# Patient Record
Sex: FEMALE | Race: WHITE | NOT HISPANIC OR LATINO | Employment: OTHER | URBAN - METROPOLITAN AREA
[De-identification: names, ages, dates, MRNs, and addresses within clinical notes are randomized per-mention and may not be internally consistent; named-entity substitution may affect disease eponyms.]

---

## 2023-11-20 ENCOUNTER — HOSPITAL ENCOUNTER (OUTPATIENT)
Facility: CLINIC | Age: 58
Setting detail: OBSERVATION
Discharge: HOME | End: 2023-11-22
Attending: EMERGENCY MEDICINE | Admitting: INTERNAL MEDICINE
Payer: COMMERCIAL

## 2023-11-20 ENCOUNTER — APPOINTMENT (OUTPATIENT)
Dept: CT IMAGING | Facility: CLINIC | Age: 58
End: 2023-11-20
Attending: EMERGENCY MEDICINE
Payer: COMMERCIAL

## 2023-11-20 ENCOUNTER — APPOINTMENT (OUTPATIENT)
Dept: GENERAL RADIOLOGY | Facility: CLINIC | Age: 58
End: 2023-11-20
Attending: EMERGENCY MEDICINE
Payer: COMMERCIAL

## 2023-11-20 ENCOUNTER — APPOINTMENT (OUTPATIENT)
Dept: CARDIOLOGY | Facility: CLINIC | Age: 58
End: 2023-11-20
Attending: INTERNAL MEDICINE
Payer: COMMERCIAL

## 2023-11-20 DIAGNOSIS — I10 ESSENTIAL HYPERTENSION: ICD-10-CM

## 2023-11-20 DIAGNOSIS — I95.9 HYPOTENSION, UNSPECIFIED HYPOTENSION TYPE: ICD-10-CM

## 2023-11-20 DIAGNOSIS — J18.9 PNEUMONIA OF RIGHT LOWER LOBE DUE TO INFECTIOUS ORGANISM: ICD-10-CM

## 2023-11-20 DIAGNOSIS — R52 PAIN: Primary | ICD-10-CM

## 2023-11-20 LAB
ALBUMIN SERPL BCG-MCNC: 3.9 G/DL (ref 3.5–5.2)
ALP SERPL-CCNC: 96 U/L (ref 40–150)
ALT SERPL W P-5'-P-CCNC: 42 U/L (ref 0–50)
ANION GAP SERPL CALCULATED.3IONS-SCNC: 7 MMOL/L (ref 7–15)
AST SERPL W P-5'-P-CCNC: 34 U/L (ref 0–45)
ATRIAL RATE - MUSE: 63 BPM
BASOPHILS # BLD AUTO: 0.1 10E3/UL (ref 0–0.2)
BASOPHILS NFR BLD AUTO: 1 %
BILIRUB SERPL-MCNC: 0.3 MG/DL
BUN SERPL-MCNC: 13.6 MG/DL (ref 6–20)
CALCIUM SERPL-MCNC: 9.9 MG/DL (ref 8.6–10)
CHLORIDE SERPL-SCNC: 105 MMOL/L (ref 98–107)
CREAT SERPL-MCNC: 1.25 MG/DL (ref 0.51–0.95)
DEPRECATED HCO3 PLAS-SCNC: 26 MMOL/L (ref 22–29)
DIASTOLIC BLOOD PRESSURE - MUSE: NORMAL MMHG
EGFRCR SERPLBLD CKD-EPI 2021: 50 ML/MIN/1.73M2
EOSINOPHIL # BLD AUTO: 0.5 10E3/UL (ref 0–0.7)
EOSINOPHIL NFR BLD AUTO: 6 %
ERYTHROCYTE [DISTWIDTH] IN BLOOD BY AUTOMATED COUNT: 12.1 % (ref 10–15)
ETHANOL SERPL-MCNC: <0.01 G/DL
GLUCOSE SERPL-MCNC: 111 MG/DL (ref 70–99)
HCO3 BLDV-SCNC: 24 MMOL/L (ref 21–28)
HCT VFR BLD AUTO: 37.1 % (ref 35–47)
HGB BLD-MCNC: 12 G/DL (ref 11.7–15.7)
HOLD SPECIMEN: NORMAL
IMM GRANULOCYTES # BLD: 0 10E3/UL
IMM GRANULOCYTES NFR BLD: 0 %
INTERPRETATION ECG - MUSE: NORMAL
LACTATE BLD-SCNC: 1.3 MMOL/L
LACTATE SERPL-SCNC: 1 MMOL/L (ref 0.7–2)
LVEF ECHO: NORMAL
LYMPHOCYTES # BLD AUTO: 2.7 10E3/UL (ref 0.8–5.3)
LYMPHOCYTES NFR BLD AUTO: 38 %
MCH RBC QN AUTO: 31.2 PG (ref 26.5–33)
MCHC RBC AUTO-ENTMCNC: 32.3 G/DL (ref 31.5–36.5)
MCV RBC AUTO: 96 FL (ref 78–100)
MONOCYTES # BLD AUTO: 0.4 10E3/UL (ref 0–1.3)
MONOCYTES NFR BLD AUTO: 5 %
NEUTROPHILS # BLD AUTO: 3.7 10E3/UL (ref 1.6–8.3)
NEUTROPHILS NFR BLD AUTO: 50 %
NRBC # BLD AUTO: 0 10E3/UL
NRBC BLD AUTO-RTO: 0 /100
NT-PROBNP SERPL-MCNC: 44 PG/ML (ref 0–900)
P AXIS - MUSE: 38 DEGREES
PCO2 BLDV: 44 MM HG (ref 40–50)
PH BLDV: 7.35 [PH] (ref 7.32–7.43)
PLATELET # BLD AUTO: 241 10E3/UL (ref 150–450)
PO2 BLDV: 26 MM HG (ref 25–47)
POTASSIUM SERPL-SCNC: 4.2 MMOL/L (ref 3.4–5.3)
PR INTERVAL - MUSE: 170 MS
PROT SERPL-MCNC: 6.1 G/DL (ref 6.4–8.3)
QRS DURATION - MUSE: 80 MS
QT - MUSE: 416 MS
QTC - MUSE: 425 MS
R AXIS - MUSE: 13 DEGREES
RBC # BLD AUTO: 3.85 10E6/UL (ref 3.8–5.2)
SAO2 % BLDV: 44 % (ref 94–100)
SODIUM SERPL-SCNC: 138 MMOL/L (ref 135–145)
SYSTOLIC BLOOD PRESSURE - MUSE: NORMAL MMHG
T AXIS - MUSE: 21 DEGREES
TROPONIN T SERPL HS-MCNC: 6 NG/L
TROPONIN T SERPL HS-MCNC: 8 NG/L
VENTRICULAR RATE- MUSE: 63 BPM
WBC # BLD AUTO: 7.3 10E3/UL (ref 4–11)

## 2023-11-20 PROCEDURE — 96361 HYDRATE IV INFUSION ADD-ON: CPT

## 2023-11-20 PROCEDURE — 250N000013 HC RX MED GY IP 250 OP 250 PS 637: Performed by: STUDENT IN AN ORGANIZED HEALTH CARE EDUCATION/TRAINING PROGRAM

## 2023-11-20 PROCEDURE — 255N000002 HC RX 255 OP 636: Performed by: INTERNAL MEDICINE

## 2023-11-20 PROCEDURE — 258N000003 HC RX IP 258 OP 636: Performed by: INTERNAL MEDICINE

## 2023-11-20 PROCEDURE — 96367 TX/PROPH/DG ADDL SEQ IV INF: CPT

## 2023-11-20 PROCEDURE — 99223 1ST HOSP IP/OBS HIGH 75: CPT | Mod: AI | Performed by: INTERNAL MEDICINE

## 2023-11-20 PROCEDURE — 71046 X-RAY EXAM CHEST 2 VIEWS: CPT

## 2023-11-20 PROCEDURE — 36415 COLL VENOUS BLD VENIPUNCTURE: CPT | Performed by: EMERGENCY MEDICINE

## 2023-11-20 PROCEDURE — 82077 ASSAY SPEC XCP UR&BREATH IA: CPT | Performed by: EMERGENCY MEDICINE

## 2023-11-20 PROCEDURE — 96365 THER/PROPH/DIAG IV INF INIT: CPT | Mod: XU

## 2023-11-20 PROCEDURE — 258N000003 HC RX IP 258 OP 636: Performed by: EMERGENCY MEDICINE

## 2023-11-20 PROCEDURE — 250N000011 HC RX IP 250 OP 636: Mod: JZ | Performed by: EMERGENCY MEDICINE

## 2023-11-20 PROCEDURE — 84484 ASSAY OF TROPONIN QUANT: CPT | Performed by: EMERGENCY MEDICINE

## 2023-11-20 PROCEDURE — 93306 TTE W/DOPPLER COMPLETE: CPT | Mod: 26 | Performed by: INTERNAL MEDICINE

## 2023-11-20 PROCEDURE — G0378 HOSPITAL OBSERVATION PER HR: HCPCS

## 2023-11-20 PROCEDURE — 250N000011 HC RX IP 250 OP 636: Performed by: EMERGENCY MEDICINE

## 2023-11-20 PROCEDURE — 71260 CT THORAX DX C+: CPT

## 2023-11-20 PROCEDURE — 96368 THER/DIAG CONCURRENT INF: CPT

## 2023-11-20 PROCEDURE — 250N000009 HC RX 250: Performed by: EMERGENCY MEDICINE

## 2023-11-20 PROCEDURE — C8929 TTE W OR WO FOL WCON,DOPPLER: HCPCS

## 2023-11-20 PROCEDURE — 87040 BLOOD CULTURE FOR BACTERIA: CPT | Mod: 91 | Performed by: EMERGENCY MEDICINE

## 2023-11-20 PROCEDURE — 80053 COMPREHEN METABOLIC PANEL: CPT | Performed by: EMERGENCY MEDICINE

## 2023-11-20 PROCEDURE — 99292 CRITICAL CARE ADDL 30 MIN: CPT

## 2023-11-20 PROCEDURE — 99291 CRITICAL CARE FIRST HOUR: CPT | Mod: 25

## 2023-11-20 PROCEDURE — 83605 ASSAY OF LACTIC ACID: CPT | Performed by: EMERGENCY MEDICINE

## 2023-11-20 PROCEDURE — 93005 ELECTROCARDIOGRAM TRACING: CPT

## 2023-11-20 PROCEDURE — 83880 ASSAY OF NATRIURETIC PEPTIDE: CPT | Performed by: ANESTHESIOLOGY

## 2023-11-20 PROCEDURE — 250N000013 HC RX MED GY IP 250 OP 250 PS 637: Performed by: INTERNAL MEDICINE

## 2023-11-20 PROCEDURE — 85025 COMPLETE CBC W/AUTO DIFF WBC: CPT | Performed by: EMERGENCY MEDICINE

## 2023-11-20 PROCEDURE — 82803 BLOOD GASES ANY COMBINATION: CPT

## 2023-11-20 RX ORDER — LISINOPRIL 40 MG/1
40 TABLET ORAL DAILY
Status: ON HOLD | COMMUNITY
End: 2023-11-22

## 2023-11-20 RX ORDER — AZITHROMYCIN 500 MG/1
500 INJECTION, POWDER, LYOPHILIZED, FOR SOLUTION INTRAVENOUS ONCE
Status: COMPLETED | OUTPATIENT
Start: 2023-11-20 | End: 2023-11-20

## 2023-11-20 RX ORDER — CYCLOBENZAPRINE HCL 10 MG
10 TABLET ORAL 2 TIMES DAILY PRN
Status: DISCONTINUED | OUTPATIENT
Start: 2023-11-20 | End: 2023-11-22 | Stop reason: HOSPADM

## 2023-11-20 RX ORDER — PREGABALIN 100 MG/1
100 CAPSULE ORAL 3 TIMES DAILY
COMMUNITY

## 2023-11-20 RX ORDER — CEFTRIAXONE 1 G/1
1 INJECTION, POWDER, FOR SOLUTION INTRAMUSCULAR; INTRAVENOUS EVERY 24 HOURS
Status: DISCONTINUED | OUTPATIENT
Start: 2023-11-21 | End: 2023-11-20

## 2023-11-20 RX ORDER — ROPIVACAINE IN 0.9% SOD CHL/PF 0.1 %
.03-.125 PLASTIC BAG, INJECTION (ML) EPIDURAL CONTINUOUS
Status: DISCONTINUED | OUTPATIENT
Start: 2023-11-20 | End: 2023-11-20

## 2023-11-20 RX ORDER — METOPROLOL TARTRATE 50 MG
50 TABLET ORAL DAILY
COMMUNITY

## 2023-11-20 RX ORDER — IOPAMIDOL 755 MG/ML
75 INJECTION, SOLUTION INTRAVASCULAR ONCE
Status: COMPLETED | OUTPATIENT
Start: 2023-11-20 | End: 2023-11-20

## 2023-11-20 RX ORDER — ACETAMINOPHEN 325 MG/1
650 TABLET ORAL EVERY 4 HOURS PRN
Status: DISCONTINUED | OUTPATIENT
Start: 2023-11-20 | End: 2023-11-22 | Stop reason: HOSPADM

## 2023-11-20 RX ORDER — AZITHROMYCIN 250 MG/1
250 TABLET, FILM COATED ORAL DAILY
Status: DISCONTINUED | OUTPATIENT
Start: 2023-11-21 | End: 2023-11-22 | Stop reason: HOSPADM

## 2023-11-20 RX ORDER — AMOXICILLIN 250 MG
1 CAPSULE ORAL 2 TIMES DAILY PRN
Status: DISCONTINUED | OUTPATIENT
Start: 2023-11-20 | End: 2023-11-22 | Stop reason: HOSPADM

## 2023-11-20 RX ORDER — PAROXETINE 20 MG/1
20 TABLET, FILM COATED ORAL EVERY MORNING
COMMUNITY

## 2023-11-20 RX ORDER — GUAIFENESIN/DEXTROMETHORPHAN 100-10MG/5
10 SYRUP ORAL EVERY 4 HOURS PRN
Status: DISCONTINUED | OUTPATIENT
Start: 2023-11-20 | End: 2023-11-22 | Stop reason: HOSPADM

## 2023-11-20 RX ORDER — BENZONATATE 100 MG/1
100 CAPSULE ORAL 3 TIMES DAILY PRN
Status: DISCONTINUED | OUTPATIENT
Start: 2023-11-20 | End: 2023-11-22 | Stop reason: HOSPADM

## 2023-11-20 RX ORDER — ACETAMINOPHEN 500 MG
1000 TABLET ORAL ONCE
Status: COMPLETED | OUTPATIENT
Start: 2023-11-20 | End: 2023-11-20

## 2023-11-20 RX ORDER — CYCLOBENZAPRINE HCL 10 MG
10 TABLET ORAL 2 TIMES DAILY PRN
COMMUNITY

## 2023-11-20 RX ORDER — ONDANSETRON 4 MG/1
4 TABLET, ORALLY DISINTEGRATING ORAL EVERY 6 HOURS PRN
Status: DISCONTINUED | OUTPATIENT
Start: 2023-11-20 | End: 2023-11-22 | Stop reason: HOSPADM

## 2023-11-20 RX ORDER — MIDODRINE HYDROCHLORIDE 10 MG/1
10 TABLET ORAL ONCE
Status: COMPLETED | OUTPATIENT
Start: 2023-11-20 | End: 2023-11-20

## 2023-11-20 RX ORDER — CEFTRIAXONE 2 G/1
2 INJECTION, POWDER, FOR SOLUTION INTRAMUSCULAR; INTRAVENOUS ONCE
Status: COMPLETED | OUTPATIENT
Start: 2023-11-20 | End: 2023-11-20

## 2023-11-20 RX ORDER — PREGABALIN 100 MG/1
100 CAPSULE ORAL 3 TIMES DAILY
Status: DISCONTINUED | OUTPATIENT
Start: 2023-11-20 | End: 2023-11-22 | Stop reason: HOSPADM

## 2023-11-20 RX ORDER — CEFTRIAXONE 2 G/1
2 INJECTION, POWDER, FOR SOLUTION INTRAMUSCULAR; INTRAVENOUS EVERY 24 HOURS
Status: DISCONTINUED | OUTPATIENT
Start: 2023-11-21 | End: 2023-11-22

## 2023-11-20 RX ORDER — AMOXICILLIN 250 MG
2 CAPSULE ORAL 2 TIMES DAILY PRN
Status: DISCONTINUED | OUTPATIENT
Start: 2023-11-20 | End: 2023-11-22 | Stop reason: HOSPADM

## 2023-11-20 RX ORDER — ONDANSETRON 2 MG/ML
4 INJECTION INTRAMUSCULAR; INTRAVENOUS EVERY 6 HOURS PRN
Status: DISCONTINUED | OUTPATIENT
Start: 2023-11-20 | End: 2023-11-22 | Stop reason: HOSPADM

## 2023-11-20 RX ORDER — SODIUM CHLORIDE 9 MG/ML
INJECTION, SOLUTION INTRAVENOUS CONTINUOUS
Status: DISCONTINUED | OUTPATIENT
Start: 2023-11-20 | End: 2023-11-21

## 2023-11-20 RX ORDER — ACETAMINOPHEN 650 MG/1
650 SUPPOSITORY RECTAL EVERY 4 HOURS PRN
Status: DISCONTINUED | OUTPATIENT
Start: 2023-11-20 | End: 2023-11-22 | Stop reason: HOSPADM

## 2023-11-20 RX ADMIN — SODIUM CHLORIDE: 9 INJECTION, SOLUTION INTRAVENOUS at 14:17

## 2023-11-20 RX ADMIN — CYCLOBENZAPRINE 10 MG: 10 TABLET, FILM COATED ORAL at 14:21

## 2023-11-20 RX ADMIN — GUAIFENESIN AND DEXTROMETHORPHAN 10 ML: 100; 10 SYRUP ORAL at 17:33

## 2023-11-20 RX ADMIN — PREGABALIN 100 MG: 100 CAPSULE ORAL at 22:41

## 2023-11-20 RX ADMIN — SODIUM CHLORIDE 80 ML: 9 INJECTION, SOLUTION INTRAVENOUS at 06:04

## 2023-11-20 RX ADMIN — SODIUM CHLORIDE, POTASSIUM CHLORIDE, SODIUM LACTATE AND CALCIUM CHLORIDE 1000 ML: 600; 310; 30; 20 INJECTION, SOLUTION INTRAVENOUS at 05:23

## 2023-11-20 RX ADMIN — GUAIFENESIN AND DEXTROMETHORPHAN 10 ML: 100; 10 SYRUP ORAL at 21:34

## 2023-11-20 RX ADMIN — IOPAMIDOL 75 ML: 755 INJECTION, SOLUTION INTRAVENOUS at 06:04

## 2023-11-20 RX ADMIN — CEFTRIAXONE SODIUM 2 G: 2 INJECTION, POWDER, FOR SOLUTION INTRAMUSCULAR; INTRAVENOUS at 06:52

## 2023-11-20 RX ADMIN — HUMAN ALBUMIN MICROSPHERES AND PERFLUTREN 9 ML: 10; .22 INJECTION, SOLUTION INTRAVENOUS at 15:22

## 2023-11-20 RX ADMIN — ACETAMINOPHEN 1000 MG: 500 TABLET, FILM COATED ORAL at 11:17

## 2023-11-20 RX ADMIN — SODIUM CHLORIDE, POTASSIUM CHLORIDE, SODIUM LACTATE AND CALCIUM CHLORIDE 1000 ML: 600; 310; 30; 20 INJECTION, SOLUTION INTRAVENOUS at 05:39

## 2023-11-20 RX ADMIN — SODIUM CHLORIDE, POTASSIUM CHLORIDE, SODIUM LACTATE AND CALCIUM CHLORIDE 1000 ML: 600; 310; 30; 20 INJECTION, SOLUTION INTRAVENOUS at 07:55

## 2023-11-20 RX ADMIN — PREGABALIN 100 MG: 100 CAPSULE ORAL at 14:21

## 2023-11-20 RX ADMIN — NOREPINEPHRINE BITARTRATE 0.03 MCG/KG/MIN: 0.02 INJECTION, SOLUTION INTRAVENOUS at 05:35

## 2023-11-20 RX ADMIN — ACETAMINOPHEN 650 MG: 325 TABLET, FILM COATED ORAL at 22:56

## 2023-11-20 RX ADMIN — AZITHROMYCIN MONOHYDRATE 500 MG: 500 INJECTION, POWDER, LYOPHILIZED, FOR SOLUTION INTRAVENOUS at 06:58

## 2023-11-20 ASSESSMENT — ACTIVITIES OF DAILY LIVING (ADL)
ADLS_ACUITY_SCORE: 23
ADLS_ACUITY_SCORE: 35
ADLS_ACUITY_SCORE: 23
ADLS_ACUITY_SCORE: 35
ADLS_ACUITY_SCORE: 35
ADLS_ACUITY_SCORE: 23
ADLS_ACUITY_SCORE: 23
ADLS_ACUITY_SCORE: 35
ADLS_ACUITY_SCORE: 22

## 2023-11-20 NOTE — ED NOTES
[facility]  ED Nurse Handoff Report    ED Chief complaint: Hypotension      ED Diagnosis:   Final diagnoses:   Hypotension, unspecified hypotension type   Pneumonia of right lower lobe due to infectious organism       Code Status: Full Code    Allergies: No Known Allergies    Patient Story: patient reports she was at home watching TV as she could not sleep. EMS reports that she took 4 mg of tizanidine for her pain due to the neck plate around 0000. She states that she did not feel any relief, which was unusual, and decided to take another 4 mg 2-3 hours later as she was confused as to whether or not she actually took the 1st dose. She then onset of dizziness when she stood up, prompting her to call EMS. She also had a brief episode of chest pain which resolved quickly   Focused Assessment:  cough and sob     Treatments and/or interventions provided: ct, xrya , meds , labs   Labs Ordered and Resulted from Time of ED Arrival to Time of ED Departure   COMPREHENSIVE METABOLIC PANEL - Abnormal       Result Value    Sodium 138      Potassium 4.2      Carbon Dioxide (CO2) 26      Anion Gap 7      Urea Nitrogen 13.6      Creatinine 1.25 (*)     GFR Estimate 50 (*)     Calcium 9.9      Chloride 105      Glucose 111 (*)     Alkaline Phosphatase 96      AST 34      ALT 42      Protein Total 6.1 (*)     Albumin 3.9      Bilirubin Total 0.3     ISTAT GASES LACTATE VENOUS POCT - Abnormal    Lactic Acid POCT 1.3      Bicarbonate Venous POCT 24      O2 Sat, Venous POCT 44 (*)     pCO2 Venous POCT 44      pH Venous POCT 7.35      pO2 Venous POCT 26     TROPONIN T, HIGH SENSITIVITY - Normal    Troponin T, High Sensitivity 8     ETHYL ALCOHOL LEVEL - Normal    Alcohol ethyl <0.01     LACTIC ACID WHOLE BLOOD - Normal    Lactic Acid 1.0     TROPONIN T, HIGH SENSITIVITY - Normal    Troponin T, High Sensitivity 6     CBC WITH PLATELETS AND DIFFERENTIAL    WBC Count 7.3      RBC Count 3.85      Hemoglobin 12.0       Hematocrit 37.1      MCV 96      MCH 31.2      MCHC 32.3      RDW 12.1      Platelet Count 241      % Neutrophils 50      % Lymphocytes 38      % Monocytes 5      % Eosinophils 6      % Basophils 1      % Immature Granulocytes 0      NRBCs per 100 WBC 0      Absolute Neutrophils 3.7      Absolute Lymphocytes 2.7      Absolute Monocytes 0.4      Absolute Eosinophils 0.5      Absolute Basophils 0.1      Absolute Immature Granulocytes 0.0      Absolute NRBCs 0.0     NT PROBNP INPATIENT   BLOOD CULTURE   BLOOD CULTURE      CT Aortic Survey w Contrast   Final Result   IMPRESSION:   1.  No aortic aneurysm or dissection.   2.  Right lower lobe pneumonia.   3.  Small amount of free pelvic fluid. No abdominal or pelvic inflammation.   4.  Fatty infiltration of the liver.                        XR Chest 2 Views   Final Result   IMPRESSION: Lungs are clear. No pleural effusion or pneumothorax. Normal heart size and pulmonary vascularity. Cervical spine fusion hardware.         Patient's response to treatments and/or interventions:  tolerated     To be done/followed up on inpatient unit:  na    Does this patient have any cognitive concerns?:       Activity level - Baseline/Home:  Independent  Activity Level - Current:   Independent    Patient's Preferred language: English   Needed?: No    Isolation: None  Infection: Not Applicable  Patient tested for COVID 19 prior to admission: NO  Bariatric?: No    Vital Signs:   Vitals:    11/20/23 0830 11/20/23 0831 11/20/23 0832 11/20/23 0833   BP: 125/68      Pulse: 60 61 59 60   Resp: 19 17 19 18   Temp:       TempSrc:       SpO2: 98% 98% 98% 98%   Weight:       Height:           Cardiac Rhythm:     Was the PSS-3 completed:   Yes  What interventions are required if any?               Family Comments:   OBS brochure/video discussed/provided to patient/family: Yes              Name of person given brochure if not patient:               Relationship to patient:     For the  majority of the shift this patient's behavior was Green.   Behavioral interventions performed were .    ED NURSE PHONE NUMBER: [phone]

## 2023-11-20 NOTE — ED PROVIDER NOTES
History     Chief Complaint:  Hypotension       The history is provided by the EMS personnel and the patient.      Zahida Mcgee is a 58 year old female with a neck plate who presents via EMS with hypotension after taking tizanidine. The patient reports she was at home watching TV as she could not sleep. EMS reports that she took 4 mg of tizanidine for her pain due to the neck plate around 0000. She states that she did not feel any relief, which was unusual, and decided to take another 4 mg 2-3 hours later as she was confused as to whether or not she actually took the 1st dose. She then onset of dizziness when she stood up, prompting her to call EMS. She also had a brief episode of chest pain which resolved quickly. EMS states that her blood pressure was initially 90/50. She was able to walk down to the ambulance but then became increasingly hypotensive, with the pressure dropping to 79/43 then 68/42. Denies any alcohol or drug use. She also takes metoprolol and lisinopril. EMS notes patient's blood sugar was normal. Zahida denies any nausea, shortness of breath, diaphoresis, or urinary symptoms. She feels okay at presentation.    Independent Historian:    EMS - provided history     Review of External Notes:  Pain clinic note 8/21/23 discussing medication management.    Medications:    Metoprolol  Lisinopril  Tizanidine  Albuterol  Colace  Lyrica  Tramadol  Claritin  Paxil  Loratadine  Flexeril  Cymbalata   Topamax     Past Medical History:    Asthma  Epilepsy   Hypertension   Lumbago   Myalgia and myositis  Postlaminectomy syndrome, cervical region     Past Surgical History:    Tubal ligation  Dental surgery, TMJ  ACDF    Physical Exam   Patient Vitals for the past 24 hrs:   BP Temp Temp src Pulse Resp SpO2 Height Weight   11/20/23 0833 -- -- -- 60 18 98 % -- --   11/20/23 0832 -- -- -- 59 19 98 % -- --   11/20/23 0831 -- -- -- 61 17 98 % -- --   11/20/23 0830 125/68 -- -- 60 19 98 % -- --   11/20/23 0820  "103/58 -- -- 71 10 98 % -- --   11/20/23 0810 104/65 -- -- 66 22 97 % -- --   11/20/23 0800 110/64 -- -- 64 23 99 % -- --   11/20/23 0757 114/71 -- -- 65 10 98 % -- --   11/20/23 0747 107/69 -- -- 66 20 98 % -- --   11/20/23 0741 104/78 -- -- 69 15 99 % -- --   11/20/23 0736 -- 97.3  F (36.3  C) Temporal -- -- -- -- --   11/20/23 0730 (!) 155/125 -- -- 56 -- 100 % -- --   11/20/23 0725 (!) 141/85 -- -- 51 23 98 % -- --   11/20/23 0715 129/84 -- -- 79 16 99 % -- --   11/20/23 0708 -- -- -- -- -- -- 1.702 m (5' 7\") --   11/20/23 0707 -- -- -- -- -- -- -- 103 kg (227 lb 1.2 oz)   11/20/23 0706 (!) 87/59 -- -- 70 (!) 70 98 % -- --   11/20/23 0649 (!) 82/40 -- -- 67 (!) 7 97 % -- --   11/20/23 0629 93/59 -- -- 57 16 98 % -- --   11/20/23 0619 109/58 -- -- 60 (!) 9 96 % -- --   11/20/23 0554 (!) 145/75 -- -- (!) 39 21 -- -- --   11/20/23 0530 (!) 74/42 -- -- 62 14 -- -- --   11/20/23 0525 -- -- -- -- -- -- -- 101.6 kg (223 lb 15.8 oz)   11/20/23 0521 (!) 64/36 -- -- 63 10 -- -- --   11/20/23 0515 (!) 86/73 -- -- 65 (!) 49 94 % -- --   11/20/23 0508 (!) 136/116 -- -- 88 -- 96 % -- --        Physical Exam  General: Appears well-developed and well-nourished.   Head: No signs of trauma.   Mouth/Throat: Oropharynx is clear and moist.   Eyes: Conjunctivae are normal. Pupils are equal, round, and reactive to light.   Neck: Normal range of motion. No nuchal rigidity. No cervical adenopathy  CV: Normal rate and regular rhythm.    Resp: Effort normal and breath sounds normal. No respiratory distress.   GI: Soft. There is no tenderness.  No rebound or guarding.  Normal bowel sounds.  No CVA tenderness.  MSK: Normal range of motion. no edema. No Calf tenderness.  Neuro: The patient is alert and oriented to person, place, and time.  PERRLA, EOMI, strength in upper/lower extremities normal and symmetrical. Sensation normal. Speech normal.  Skin: Skin is warm and dry. No rash noted.   Psych: normal mood and affect. behavior is normal. "       Emergency Department Course   ECG  ECG taken at 0508, ECG read at 0509  Normal sinus rhythm  Cannot rule out anterior infarct, age undetermined  Abnormal ECG   Rate 63 bpm. ND interval 170 ms. QRS duration 80 ms. QT/QTc 416/425 ms. P-R-T axes 38 13 21.    Imaging:  CT Aortic Survey w Contrast   Final Result   IMPRESSION:   1.  No aortic aneurysm or dissection.   2.  Right lower lobe pneumonia.   3.  Small amount of free pelvic fluid. No abdominal or pelvic inflammation.   4.  Fatty infiltration of the liver.                        XR Chest 2 Views   Final Result   IMPRESSION: Lungs are clear. No pleural effusion or pneumothorax. Normal heart size and pulmonary vascularity. Cervical spine fusion hardware.        Report per radiology    Laboratory:  Labs Ordered and Resulted from Time of ED Arrival to Time of ED Departure   COMPREHENSIVE METABOLIC PANEL - Abnormal       Result Value    Sodium 138      Potassium 4.2      Carbon Dioxide (CO2) 26      Anion Gap 7      Urea Nitrogen 13.6      Creatinine 1.25 (*)     GFR Estimate 50 (*)     Calcium 9.9      Chloride 105      Glucose 111 (*)     Alkaline Phosphatase 96      AST 34      ALT 42      Protein Total 6.1 (*)     Albumin 3.9      Bilirubin Total 0.3     ISTAT GASES LACTATE VENOUS POCT - Abnormal    Lactic Acid POCT 1.3      Bicarbonate Venous POCT 24      O2 Sat, Venous POCT 44 (*)     pCO2 Venous POCT 44      pH Venous POCT 7.35      pO2 Venous POCT 26     TROPONIN T, HIGH SENSITIVITY - Normal    Troponin T, High Sensitivity 8     ETHYL ALCOHOL LEVEL - Normal    Alcohol ethyl <0.01     LACTIC ACID WHOLE BLOOD - Normal    Lactic Acid 1.0     TROPONIN T, HIGH SENSITIVITY - Normal    Troponin T, High Sensitivity 6     CBC WITH PLATELETS AND DIFFERENTIAL    WBC Count 7.3      RBC Count 3.85      Hemoglobin 12.0      Hematocrit 37.1      MCV 96      MCH 31.2      MCHC 32.3      RDW 12.1      Platelet Count 241      % Neutrophils 50      % Lymphocytes 38      %  Monocytes 5      % Eosinophils 6      % Basophils 1      % Immature Granulocytes 0      NRBCs per 100 WBC 0      Absolute Neutrophils 3.7      Absolute Lymphocytes 2.7      Absolute Monocytes 0.4      Absolute Eosinophils 0.5      Absolute Basophils 0.1      Absolute Immature Granulocytes 0.0      Absolute NRBCs 0.0     NT PROBNP INPATIENT   BLOOD CULTURE   BLOOD CULTURE        Emergency Department Course & Assessments:             Interventions:  Medications   norepinephrine (LEVOPHED) 4 mg in  mL PERIPHERAL infusion (0 mcg/kg/min × 101.6 kg Intravenous Paused 11/20/23 0734)   lactated ringers BOLUS 1,000 mL (0 mLs Intravenous Stopped 11/20/23 0622)   lactated ringers BOLUS 1,000 mL (0 mLs Intravenous Stopped 11/20/23 0654)   iopamidol (ISOVUE-370) solution 75 mL (75 mLs Intravenous $Given 11/20/23 0604)   Saline (80 mLs Intravenous $Given 11/20/23 0604)   cefTRIAXone (ROCEPHIN) 2 g vial to attach to  ml bag for ADULTS or NS 50 ml bag for PEDS (0 g Intravenous Stopped 11/20/23 0757)   azithromycin (ZITHROMAX) 500 mg vial to attach to  mL bag (0 mg Intravenous Stopped 11/20/23 0810)   lactated ringers BOLUS 1,000 mL (1,000 mLs Intravenous $New Bag 11/20/23 0755)   midodrine (PROAMATINE) tablet 10 mg (10 mg Oral Not Given 11/20/23 0821)        Assessments:  0505 I obtained history and examined the patient as noted above.    Independent Interpretation (X-rays, CTs, rhythm strip):  On  my independent interpretation of abdominal CT there is no significant hydronephrosis    Consultations/Discussion of Management or Tests:  0528 I spoke with poison control regarding the patient's history and presentation in the emergency department today.  0715 I spoke with Dr. Amado with ICU, who assisted with further recommendations  0844 I spoke with Dr. Amado, pt's status improved and ICU not needed at this time.      Social Determinants of Health affecting care:  None    Disposition:  The patient was admitted to  the hospital under the care of Dr. Mari.     Impression & Plan      Medical Decision Making:  Zahida Mcgee presents due to lightheadedness and dizziness.  She reports that she is on a few different medications for some chronic back and neck pain including tizanidine.  She states that she was not sure if she had taken her dose of this medication this evening, but she thought that she may have.  When it did not seem like it was helping her, she ultimately took another dose of the medication to try to help her sleep.  She denies taking any extra doses of any other medications but did take her normal evening medications.  When she got up and became lightheaded and dizzy, she called EMS.  EMS reports that the patient was able to ambulate to the ambulance, but in route she did become hypotensive.      On arrival to the ER she was in fact hypotensive.  Patient had not been having any other symptoms or complaints.  With the back pain and low blood pressure I did consider possibility of aortic pathology did obtain a CT scan.  This did not show any signs of aortic pathology although it did show a right lower lobe infiltrate.  On speaking with the patient further, she states that she had had a minor cough over the last few days, but did not think anything of it.  When pushed further, she also stated that she had a very brief period of left-sided chest discomfort that lasted less than 1 minute last night.  EKG and serial troponins were unremarkable.  Serial lactic acids were negative and had a normal white blood cell count and no fever.  I have a low suspicion for PE as she does not have concerning risk factors, no SOB, tachycardia, or hypoxia. No signs of PE or right heart strain on CT.      She was given IV fluids but given the persistent hypotension, I did start a low-dose of Levophed.  On the very lowest dose of Levophed, the patient's blood pressure would quickly jump up.  We did try stopping the Levophed, but  unfortunately her blood pressure gradually declined once again.  I did speak with poison control regarding the possibility of an extra dose of tizanidine.  They report that a single extra dose should not typically cause this type of hypotension.  Initially I had spoken with the intensive care doctor when the Levophed was restarted.  Given bed constraints and the such small dose of Levophed that was necessary, it was decided to give the patient a slightly longer period of observation in the ER and she was given an additional liter of IV fluids.  Ultimately we were able to stop the Levophed.  We considered given the patient Midodrine to support her blood pressure orally, but this was not necessary.  With time, the patient was able to stand and ambulate and overall felt well.  I do not believe that the hypotension is secondary to sepsis from pneumonia.  Given its brief nature and overall presentation, I do favor accidental medication side effect.  Given the overall presentation, the patient was admitted to the hospital for continued monitoring to ensure her prolonged stability.    Critical Care time:  was 45 minutes for this patient excluding procedures.    Diagnosis:    ICD-10-CM    1. Hypotension, unspecified hypotension type  I95.9       2. Pneumonia of right lower lobe due to infectious organism  J18.9              Scribe Disclosure:  Arcadio BARAJAS, am serving as a scribe at 5:23 AM on 11/20/2023 to document services personally performed by Colin Jarvis MD, based on my observations and the provider's statements to me.  11/20/2023   Colin Jarvis MD Bergenstal, John A, MD  11/20/23 0974

## 2023-11-20 NOTE — PROGRESS NOTES
Observation goals    -diagnostic tests and consults completed and resulted: not met    -vital signs normal or at patient baseline: not met    -returns to baseline functional status : partially  met

## 2023-11-20 NOTE — PLAN OF CARE
Goal Outcome Evaluation:      Plan of Care Reviewed With: patient    PRIMARY Concern: Pt here with episode of  low BP after taking tizanidine at home. Also found to have RLL pneumonia   SAFETY RISK Concerns (fall risk, behaviors, etc.): fall risk      Isolation/Type: none  Tests/Procedures for NEXT shift: echo result pending.   Consults? (Pending/following, signed-off?) none  Where is patient from? (Home, TCU, etc.): home  Other Important info for NEXT shift: hx of neck surgery. Takes prn meds. Cough present   Anticipated DC date & active delays: tomorrow   _____________________________________________________________________________  SUMMARY NOTE:  Orientation/Cognitive: A/o X4  Observation Goals (Met/ Not Met): not met   Mobility Level/Assist Equipment: up with SBA  Antibiotics & Plan (IV/po, length of tx left): IV ceftriaxone   Pain Management: lyrica and flexeril   Tele/VS/O2: VSS, RA,   ABNL Lab/BG: none  Diet: regular   Bowel/Bladder: continence   Skin Concerns: none  Drains/Devices: PIV  Patient Stated Goal for Today: rest

## 2023-11-20 NOTE — H&P
[facility]    History and Physical - Hospitalist Service       Date of Admission:  11/20/2023    Assessment & Plan   Zahida Mcgee is a 58 year old female visiting from [place] with hx of HTN and chronic neck/back pain s/p cervical laminectomy who was admitted on 11/20/2023 for transient shock of unclear etiology, possible unintentional overdose.    Activated EMS for presyncope after possibly taking an extra dose of tizanidine. Initial BP in the field 90/50, subsequently decreased to 70s/40s en route to the ED. She was otherwise afebrile, other VSS. Labs significant only for creatinine 1.25. Lactate normal. Trop x2 normal. EKG showed NSR. She denies alcohol or illicit drug use. Alcohol level normal. Initially required low-dose norepinephrine in the ED, but was rapidly weaned off. CT aortic survey significant only for RLL infiltrate. Symptoms improved while in the ED, and admission for observation was requested.       Circulatory shock: Improving  Possible unintentional overdose  *Pt doesn't remember how much tizanidine she took, but it's possible that she took more tizanidine than she reported. As a central alpha agent, it can cause hypotension. On chronic antihypertensives at home which possibly contributed. She has no other s/sx to suggest cardiogenic or septic shock.   - Current BP 100s/60s  - Hold PTA antihypertensives  - IV fluids ordered: NS at 100 ml/h x10 hours   - Monitor on telemetry  - Daily orthostatics ordered  - TTE ordered    RLL community-acquired bacterial pneumonia  *Initiated on ceftriaxone and azithromycin in the ED  - Cont ceftriaxone and azithromycin    GIACOMO  *Creatinine 1.25 on admission. No recent baseline, but previously 0.9-1.   - IV fluids ordered  - Hold PTA lisinopril    Hypertension  *PTA regimen: lisinopril 40 mg daily, metoprolol 50 mg daily  - Holding home meds due to hypotension    Chronic pain syndrome  Cervicalgia s/p cervical laminectomy  *PTA  regimen: pregabalin 100 mg TID, Flexeril 10 mg BID prn, tizanidine 4 mg BID prn  - Hold tizanidine  - Cont pregabalin and Flexeril    Obesity  BMI 35.56    Diet: Regular diet  DVT Prophylaxis: Ambulate every shift  Preston Catheter: Not present  Code Status:  Full Code       Disposition: Expected discharge home tomorrow if BP remains stable    Milli Mari MD  [facility]  Contact information available via [facility] Paging/Directory      ______________________________________________________________________    Chief Complaint   Dizziness/lightheadedness    History is obtained from the patient, discussion with ED staff, and review of medical records    History of Present Illness   Zahida Mcgee is a 58 year old female visiting from [place] with hx of HTN and chronic neck/back pain s/p cervical laminectomy who was admitted on 11/20/2023 for transient shock of unclear etiology, possible unintentional overdose.    Patient is visiting her daughters from [place]. She reports recent non-productive cough and shortness of breath, but otherwise has been at her usual state of health. While watching TV early this morning, she developed uncontrolled neck pain; she took a dose of tizanidine, but wasn't sure if she had already taken it earlier. Shortly thereafter, she became dizzy and lightheaded, and unsteady while attempting to ambulate. She doesn't know if she lost consciousness. Due to ongoing presyncope, she activated EMS.     Initial BP in the field 90/50, subsequently decreased to 70s/40s en route to the ED. She was otherwise afebrile, other VSS. Labs unremarkable. Trop x2 normal. Lactate normal. Alcohol level normal. Initially required low-dose norepinephrine in the ED, but was rapidly weaned off. CT aortic survey significant only for RLL infiltrate. Symptoms improved while in the ED, and admission for observation was requested. As mentioned above, she reports non-productive cough and  shortness of breath over the past few days, but denies fevers/chills or chest pain.    Review of Systems    10 point Review of Systems was reviewed and pertinent positives and negatives are noted in the HPI    Past Medical History    I have reviewed this patient's medical history and updated it with pertinent information if needed.   Past Medical History:   Diagnosis Date    Benign essential hypertension     Cervicalgia     Lumbago        Past Surgical History   I have reviewed this patient's surgical history and updated it with pertinent information if needed.  Past Surgical History:   Procedure Laterality Date    CERVICAL LAMINECTOMY         Social History   She denies history of smoking, alcohol, or illicit drug use. She typically lives alone.    Family History   Family history was reviewed and non-contributory    Prior to Admission Medications   Prior to Admission Medications   Prescriptions Last Dose Informant Patient Reported? Taking?   Magnesium Oxide, Laxative, (LAX) 500 MG TABS tablet PRN Self Yes Yes   Sig: Take 400 mg by mouth daily as needed   PARoxetine (PAXIL) 20 MG tablet 11/19/2023 Self Yes Yes   Sig: Take 20 mg by mouth every morning   cyclobenzaprine (FLEXERIL) 10 MG tablet PRN Self Yes Yes   Sig: Take 10 mg by mouth 2 times daily as needed for muscle spasms   lisinopril (ZESTRIL) 40 MG tablet 11/19/2023 Self Yes Yes   Sig: Take 40 mg by mouth daily   metoprolol tartrate (LOPRESSOR) 50 MG tablet 11/19/2023 Self Yes Yes   Sig: Take 50 mg by mouth daily   pregabalin (LYRICA) 100 MG capsule 11/19/2023 Self Yes Yes   Sig: Take 100 mg by mouth 3 times daily   tiZANidine (ZANAFLEX) 4 MG tablet PRN Self Yes Yes   Sig: Take 4 mg by mouth 2 times daily as needed for muscle spasms      Facility-Administered Medications: None     Allergies   Allergies   Allergen Reactions    Sulfa Antibiotics Hallucination       Physical Exam   Vital Signs: Temp: 97.3  F (36.3  C) Temp src: Temporal BP: 102/62 Pulse: 61    Resp: 21 SpO2: 100 %      Weight: 227 lbs 1.18 oz    Constitutional: Resting comfortably, NAD  HEENT: NC/AT, sclera white, conjunctiva clear, EOMI, MMM  Respiratory: Breathing non-labored. Lungs CTAB - no wheezes/crackles/rhonchi  Cardiovascular: Heart RRR, no m/r/g. No pedal edema.   GI: +BS. Abd soft/NT  Lymph/Hematologic: No cervical LAD  Skin: Warm and dry. No rash.  Musculoskeletal: Normal muscle bulk and tone  Neurologic: Alert and appropriate. GARCIA  Psychiatric: Calm and cooperative     Data   Data reviewed today: I reviewed all medications, new labs and imaging results over the last 24 hours. I personally reviewed the EKG tracing showing normal sinus rhythm and the chest CT image(s) showing RLL infiltrate .    Recent Labs   Lab 11/20/23  0513   WBC 7.3   HGB 12.0   MCV 96         POTASSIUM 4.2   CHLORIDE 105   CO2 26   BUN 13.6   CR 1.25*   ANIONGAP 7   ALBERTO 9.9   *   ALBUMIN 3.9   PROTTOTAL 6.1*   BILITOTAL 0.3   ALKPHOS 96   ALT 42   AST 34         Recent Results (from the past 24 hour(s))   XR Chest 2 Views    Narrative    EXAM: XR CHEST 2 VIEWS  LOCATION: [facility]  DATE: 11/20/2023    INDICATION: hypotension  COMPARISON: None.      Impression    IMPRESSION: Lungs are clear. No pleural effusion or pneumothorax. Normal heart size and pulmonary vascularity. Cervical spine fusion hardware.   CT Aortic Survey w Contrast    Narrative    EXAM: CT AORTIC SURVEY W CONTRAST  LOCATION: [facility]  DATE: 11/20/2023    INDICATION: Chest pain. Hypotension.  COMPARISON: None.  TECHNIQUE: CT angiogram chest abdomen pelvis during arterial phase of injection of IV contrast. 2D and 3D MIP reconstructions were performed by the CT technologist. Dose reduction techniques were used.   CONTRAST: 72mL Isovue 370.    FINDINGS:   CT ANGIOGRAM CHEST, ABDOMEN, AND PELVIS: The thoracic and abdominal aorta are normal in caliber. There is no aortic aneurysm or  dissection. All major aortic branch vessels are widely patent. There is an accessory right renal artery. The iliac arterial   system is normal in caliber.    LUNGS AND PLEURA: There is a right lower lobe infiltrate. Small band of scar or atelectasis in the lingula. There is no pneumothorax or pleural effusion.    MEDIASTINUM/AXILLAE: Normal.    CORONARY ARTERY CALCIFICATION: None.    HEPATOBILIARY: The gallbladder is contracted. There are no calcified gallstones. Diffuse fatty infiltration of the liver.    PANCREAS: Normal.    SPLEEN: Normal.    ADRENAL GLANDS: Normal.    KIDNEYS/BLADDER: Normal.    BOWEL: Normal.    LYMPH NODES: Normal.    PELVIC ORGANS: Uterus and adnexa appear normal. Small amount of free pelvic fluid.    MUSCULOSKELETAL: Mild degenerative disease in the spine.      Impression    IMPRESSION:  1.  No aortic aneurysm or dissection.  2.  Right lower lobe pneumonia.  3.  Small amount of free pelvic fluid. No abdominal or pelvic inflammation.  4.  Fatty infiltration of the liver.

## 2023-11-20 NOTE — PROGRESS NOTES
Tele-Triage Note:  58F hx chronic back pain  On tizanidine as part of her regimen  Per ED doc, may have taken an extra dose, but no more than that.  Seems reliable, but she cannot remember if she took an extra dose in the middle of the night  Acknowledges cough and mild chest discomfort, imaging suggests pneumonia.  However, not needing any oxygen / never hypoxic.  Woke upfeeling weak, nauseated, light headed  SBP 90s -60s, mostly in the 80s  Responds well to low-dose norepinpehrine  Labs all look fine other than a Cr 1.25 (unclear baeline) - no lactate or acidosis, no leukocytosis  LFTs fine    A single extra dose of tizanidine should not cause hypotension (central alpha agent)  EKG, troponin do not suggest a cardiogenic source of her hypotension; I added BNP as a screen for heart failure, although the CT does not suggest pulmonary edema as I would expect to see in decompensated failure  CT did not show dissection or bleed or reason for hypovolemic hypotension  Did not get a dedicated CTA of chest, but nothing in her history to suggest risk factor for DVT, and she is not tachycardic, speaking against obstructive shock (can't rule out pericardial effusion / tamponade without an echo)  She may have a pneumonia bsaed on consolidation seen on CT, but the lack of leukocytosis, fever, or oxygen requirements speaks strongly against this or distribute shock as an etiology for her hypotension.      I recommended a trial of oral midodrine (10-20mg) and BNP / point-of-care- echo.  If patient is unable to come off peripheral pressors at that time (within ~1-1.5h), then will bring to ICU for further workup and management of pressors.  Otherwise, work from the presumption tht she took more tizanidine than she reported, and that this is alpha agent overdose (which is consistent with her relative bradycardia and hypotension).  Also rule out heart failure, although this seems less likely based on data availble.    I did not see or  personally evaluate the patient, and my phone consultation is not meant to replace or supersede the clinical judgement of the in-person treating provider.     No billed time.    JOYCE Tran MD  Clinical   Anesthesia / Critical Care  *[phone]

## 2023-11-20 NOTE — PHARMACY-ADMISSION MEDICATION HISTORY
Pharmacy Intern Admission Medication History    Admission medication history is complete. The information provided in this note is only as accurate as the sources available at the time of the update.    Information Source(s): Patient and CareEverywhere/SureScripts via in-person    Pertinent Information:   Pregabalin -  Outside records report last fill was 08/27/2023 for 45 day supply. Patient reported to still have supply left at home and has been taking 100 mg three times daily. Added to PTA list per patient report.     Changes made to PTA medication list:  Added: Added all medications to PTA list.   Deleted: None  Changed: None    Medication Affordability:  Not including over the counter (OTC) medications, was there a time in the past 3 months when you did not take your medications as prescribed because of cost?: No    Allergies reviewed with patient and updates made in EHR: yes    Medication History Completed By: Dona Florian 11/20/2023 9:09 AM    Prior to Admission medications    Medication Sig Last Dose Taking? Auth Provider Long Term End Date   cyclobenzaprine (FLEXERIL) 10 MG tablet Take 10 mg by mouth 2 times daily as needed for muscle spasms PRN Yes Unknown, Entered By History     lisinopril (ZESTRIL) 40 MG tablet Take 40 mg by mouth daily 11/19/2023 Yes Unknown, Entered By History Yes    Magnesium Oxide, Laxative, (LAX) 500 MG TABS tablet Take 400 mg by mouth daily as needed PRN Yes Unknown, Entered By History     metoprolol tartrate (LOPRESSOR) 50 MG tablet Take 50 mg by mouth daily 11/19/2023 Yes Unknown, Entered By History Yes    PARoxetine (PAXIL) 20 MG tablet Take 20 mg by mouth every morning 11/19/2023 Yes Unknown, Entered By History Yes    pregabalin (LYRICA) 100 MG capsule Take 100 mg by mouth 3 times daily 11/19/2023 Yes Unknown, Entered By History Yes    tiZANidine (ZANAFLEX) 4 MG tablet Take 4 mg by mouth 2 times daily as needed for muscle spasms PRN Yes Unknown, Entered By History

## 2023-11-21 LAB
ANION GAP SERPL CALCULATED.3IONS-SCNC: 9 MMOL/L (ref 7–15)
BUN SERPL-MCNC: 13.4 MG/DL (ref 6–20)
CALCIUM SERPL-MCNC: 9.8 MG/DL (ref 8.6–10)
CHLORIDE SERPL-SCNC: 109 MMOL/L (ref 98–107)
CREAT SERPL-MCNC: 0.97 MG/DL (ref 0.51–0.95)
DEPRECATED HCO3 PLAS-SCNC: 24 MMOL/L (ref 22–29)
EGFRCR SERPLBLD CKD-EPI 2021: 68 ML/MIN/1.73M2
ERYTHROCYTE [DISTWIDTH] IN BLOOD BY AUTOMATED COUNT: 12 % (ref 10–15)
GLUCOSE SERPL-MCNC: 100 MG/DL (ref 70–99)
HCT VFR BLD AUTO: 37 % (ref 35–47)
HGB BLD-MCNC: 12.2 G/DL (ref 11.7–15.7)
MCH RBC QN AUTO: 31.1 PG (ref 26.5–33)
MCHC RBC AUTO-ENTMCNC: 33 G/DL (ref 31.5–36.5)
MCV RBC AUTO: 94 FL (ref 78–100)
PLATELET # BLD AUTO: 219 10E3/UL (ref 150–450)
POTASSIUM SERPL-SCNC: 4.2 MMOL/L (ref 3.4–5.3)
RBC # BLD AUTO: 3.92 10E6/UL (ref 3.8–5.2)
SODIUM SERPL-SCNC: 142 MMOL/L (ref 135–145)
WBC # BLD AUTO: 6.1 10E3/UL (ref 4–11)

## 2023-11-21 PROCEDURE — 250N000013 HC RX MED GY IP 250 OP 250 PS 637: Performed by: PHYSICIAN ASSISTANT

## 2023-11-21 PROCEDURE — 250N000011 HC RX IP 250 OP 636: Mod: JZ | Performed by: INTERNAL MEDICINE

## 2023-11-21 PROCEDURE — 250N000009 HC RX 250: Performed by: PHYSICIAN ASSISTANT

## 2023-11-21 PROCEDURE — 94640 AIRWAY INHALATION TREATMENT: CPT

## 2023-11-21 PROCEDURE — G0378 HOSPITAL OBSERVATION PER HR: HCPCS

## 2023-11-21 PROCEDURE — 85027 COMPLETE CBC AUTOMATED: CPT | Performed by: INTERNAL MEDICINE

## 2023-11-21 PROCEDURE — 99233 SBSQ HOSP IP/OBS HIGH 50: CPT | Performed by: PHYSICIAN ASSISTANT

## 2023-11-21 PROCEDURE — 999N000157 HC STATISTIC RCP TIME EA 10 MIN

## 2023-11-21 PROCEDURE — 250N000012 HC RX MED GY IP 250 OP 636 PS 637: Performed by: PHYSICIAN ASSISTANT

## 2023-11-21 PROCEDURE — 36415 COLL VENOUS BLD VENIPUNCTURE: CPT | Performed by: INTERNAL MEDICINE

## 2023-11-21 PROCEDURE — 99207 PR APP CREDIT; MD BILLING SHARED VISIT: CPT | Mod: FS | Performed by: HOSPITALIST

## 2023-11-21 PROCEDURE — 96376 TX/PRO/DX INJ SAME DRUG ADON: CPT

## 2023-11-21 PROCEDURE — 82374 ASSAY BLOOD CARBON DIOXIDE: CPT | Performed by: INTERNAL MEDICINE

## 2023-11-21 PROCEDURE — 250N000013 HC RX MED GY IP 250 OP 250 PS 637: Performed by: INTERNAL MEDICINE

## 2023-11-21 RX ORDER — METOPROLOL TARTRATE 50 MG
50 TABLET ORAL DAILY
Status: DISCONTINUED | OUTPATIENT
Start: 2023-11-21 | End: 2023-11-22 | Stop reason: HOSPADM

## 2023-11-21 RX ORDER — IPRATROPIUM BROMIDE AND ALBUTEROL SULFATE 2.5; .5 MG/3ML; MG/3ML
3 SOLUTION RESPIRATORY (INHALATION)
Status: DISCONTINUED | OUTPATIENT
Start: 2023-11-21 | End: 2023-11-22

## 2023-11-21 RX ORDER — PREDNISONE 20 MG/1
40 TABLET ORAL DAILY
Status: DISCONTINUED | OUTPATIENT
Start: 2023-11-21 | End: 2023-11-22 | Stop reason: HOSPADM

## 2023-11-21 RX ORDER — LISINOPRIL 40 MG/1
40 TABLET ORAL DAILY
Status: DISCONTINUED | OUTPATIENT
Start: 2023-11-21 | End: 2023-11-22 | Stop reason: HOSPADM

## 2023-11-21 RX ADMIN — PREGABALIN 100 MG: 100 CAPSULE ORAL at 20:34

## 2023-11-21 RX ADMIN — PREDNISONE 40 MG: 20 TABLET ORAL at 11:25

## 2023-11-21 RX ADMIN — Medication 1 LOZENGE: at 15:19

## 2023-11-21 RX ADMIN — IPRATROPIUM BROMIDE AND ALBUTEROL SULFATE 3 ML: .5; 3 SOLUTION RESPIRATORY (INHALATION) at 15:30

## 2023-11-21 RX ADMIN — Medication 1 LOZENGE: at 17:29

## 2023-11-21 RX ADMIN — PREGABALIN 100 MG: 100 CAPSULE ORAL at 08:27

## 2023-11-21 RX ADMIN — Medication 1 LOZENGE: at 21:50

## 2023-11-21 RX ADMIN — IPRATROPIUM BROMIDE AND ALBUTEROL SULFATE 3 ML: .5; 3 SOLUTION RESPIRATORY (INHALATION) at 19:45

## 2023-11-21 RX ADMIN — GUAIFENESIN AND DEXTROMETHORPHAN 10 ML: 100; 10 SYRUP ORAL at 13:01

## 2023-11-21 RX ADMIN — CYCLOBENZAPRINE 10 MG: 10 TABLET, FILM COATED ORAL at 20:39

## 2023-11-21 RX ADMIN — METOPROLOL TARTRATE 50 MG: 50 TABLET, FILM COATED ORAL at 13:04

## 2023-11-21 RX ADMIN — AZITHROMYCIN DIHYDRATE 250 MG: 250 TABLET ORAL at 08:27

## 2023-11-21 RX ADMIN — BENZONATATE 100 MG: 100 CAPSULE ORAL at 03:11

## 2023-11-21 RX ADMIN — CYCLOBENZAPRINE 10 MG: 10 TABLET, FILM COATED ORAL at 03:11

## 2023-11-21 RX ADMIN — CEFTRIAXONE SODIUM 2 G: 2 INJECTION, POWDER, FOR SOLUTION INTRAMUSCULAR; INTRAVENOUS at 08:36

## 2023-11-21 RX ADMIN — ACETAMINOPHEN 650 MG: 325 TABLET, FILM COATED ORAL at 21:54

## 2023-11-21 RX ADMIN — BENZONATATE 100 MG: 100 CAPSULE ORAL at 12:57

## 2023-11-21 RX ADMIN — ACETAMINOPHEN 650 MG: 325 TABLET, FILM COATED ORAL at 03:11

## 2023-11-21 RX ADMIN — PREGABALIN 100 MG: 100 CAPSULE ORAL at 13:04

## 2023-11-21 ASSESSMENT — ACTIVITIES OF DAILY LIVING (ADL)
ADLS_ACUITY_SCORE: 22

## 2023-11-21 NOTE — PROGRESS NOTES
[facility]    Medicine Progress Note - Hospitalist Service    Date of Admission:  11/20/2023    Assessment & Plan   Zahida Mcgee is a 58 year old female visiting from [place] with hx of HTN and chronic neck/back pain s/p cervical laminectomy who was admitted on 11/20/2023 for transient shock of unclear etiology, possible unintentional overdose.     Activated EMS for presyncope after possibly taking an extra dose of tizanidine. Initial BP in the field 90/50, subsequently decreased to 70s/40s en route to the ED. She was otherwise afebrile, other VSS. Labs significant only for creatinine 1.25. Lactate normal. Trop x2 normal. EKG showed NSR. She denies alcohol or illicit drug use. Alcohol level normal. Initially required low-dose norepinephrine in the ED, but was rapidly weaned off. CT aortic survey significant only for RLL infiltrate. Symptoms improved while in the ED, and admission for observation was requested.      Circulatory shock, resolved   Unintentional overdose  *Pt doesn't remember how much tizanidine she took, but it's possible that she took more tizanidine than she reported. As a central alpha agent, it can cause hypotension. On chronic antihypertensives at home which possibly contributed. She has no other s/sx to suggest cardiogenic or septic shock. Denies any thoughts of self harm.   *Echo with preserved EF, no significant valve abnormalities. No RWMA. Orthostatics negative.   *Pt now hypertensive 11/21   - Discontinue IVF, pt able to demonstrate adequate oral fluid intake   - Resume PTA Metoprolol 11/21, await response and can add back Lisinopril as appropriate   - Telemetry     Dizziness: Describes lightheadedness with ambulation. No focal neuro deficits. Reports chest pain and headache from coughing. Denies hx of syncope or vertigo. With above event, cannot definitely say if she lost consciousness. No loss of bladder/bowel function, no hx of seizure. Fell against a  window.   *Cardiac work-up as above including unremarkable telemetry, negative orthostatics, unremarkable echocardiogram   - Monitor   - Alert Hospitalist if any change in neuro status      RLL community-acquired bacterial pneumonia  Asthma exacerbation   *Initiated on ceftriaxone and azithromycin in the ED  - Cont ceftriaxone and azithromycin  - Start prednisone 40 mg po every day X5 days on 11/21   - Duonebs q4 hrs while awake, PRN albuterol nebs   - Tessalon, Robitussin  - Encourage pulmonary toilet   - Monitor oxygen needs, saturating well on room air at present   - Obtain ambulatory oxygen levels     GIACOMO, resolved  *Creatinine 1.25 on admission. No recent baseline, but previously 0.9-1.   - Improved to suspected baseline of 0.97 after IVF resuscitation   - Hold PTA lisinopril, resume 11/22 as appropriate     Intermittent L sided chest pain: Present for a couple months. Nonradiating. No correlation with activity or positional change. No associated symptoms. Mother with premature CAD.   *Trop negative X2, EKG with NSR, slight TWI anterior leads. CT aortic survery reports no coronary artery calcification   - Consider outpatient stress testing vs CT coronary angiogram if persists      Hypertension  *PTA regimen: lisinopril 40 mg daily, metoprolol 50 mg daily  - Resumed metoprolol 11/21, hold lisinopril and resume as appropriate      Chronic pain syndrome  Cervicalgia s/p cervical laminectomy: Baseline R sided radiculopathy.   *PTA regimen: pregabalin 100 mg TID, Flexeril 10 mg BID prn, tizanidine 4 mg BID prn  - Hold tizanidine  - Cont pregabalin and Flexeril     Obesity  BMI 35.56       Observation Goals: -diagnostic tests and consults completed and resulted, -vital signs normal or at patient baseline, -returns to baseline functional status, Nurse to notify provider when observation goals have been met and patient is ready for discharge.  Diet: Regular Diet Adult    DVT Prophylaxis: Pneumatic Compression Devices  "and Ambulate every shift  Preston Catheter: Not present  Lines: None     Cardiac Monitoring: ACTIVE order. Indication: Syncope- low cardiac risk (24 hours)  Code Status: Full Code      Clinically Significant Risk Factors Present on Admission                  # Hypertension: Home medication list includes antihypertensive(s)      # Obesity: Estimated body mass index is 38.83 kg/m  as calculated from the following:    Height as of this encounter: 1.575 m (5' 2\").    Weight as of this encounter: 96.3 kg (212 lb 4.9 oz).              Disposition Plan     Expected Discharge Date: 11/21/2023                  The patient's care was discussed with the Attending Physician, Dr. Menendez, Bedside Nurse, and Patient.    Andreia Chicas PA-C  Hospitalist Service  [facility]  Securely message with [employer] (more info)  Text page via [facility] Paging/Directory   ______________________________________________________________________    Interval History   Assumed care 11/21. Significant cough causing headache, chest discomfort. Dizziness with ambulation, orthostatics negative. Voiding well. No focal neuro deficits or vertiginous symptoms. Discussed plan of care as above.     Physical Exam   Vital Signs: Temp: 97.7  F (36.5  C) Temp src: Oral BP: (!) 142/90 Pulse: 78   Resp: 18 SpO2: 96 % O2 Device: None (Room air)    Weight: 212 lbs 4.85 oz    CONSTITUTIONAL: Pt laying in bed, dressed in hospital garb. Appears uncomfortable while coughing. Cooperative with interview.   HEENT: Normocephalic, atraumatic.   CARDIOVASCULAR: RRR, no murmurs, rubs, or extra heart sounds appreciated. Pulses +2/4 and regular in upper and lower extremities, bilaterally.   RESPIRATORY: No increased work of breathing.   Crackles in right lung, no appreciable wheezes on exam.   GASTROINTESTINAL:  Negative for tenderness to percussion or light and deep palpation.  No masses or organomegaly noted.  MUSCULOSKELETAL: No gross " deformities noted. Normal muscle tone.   HEMATOLOGIC/LYMPHATIC/IMMUNOLOGIC: Negative for lower extremity edema, bilaterally.  NEUROLOGIC: Alert and oriented to person, place, and time. No focal neuro deficits.   SKIN: Warm, dry, intact.     Medical Decision Making       50 MINUTES SPENT BY ME on the date of service doing chart review, history, exam, documentation & further activities per the note.      Data     I have personally reviewed the following data over the past 24 hrs:    6.1  \   12.2   / 219     142 109 (H) 13.4 /  100 (H)   4.2 24 0.97 (H) \       Imaging results reviewed over the past 24 hrs:   No results found for this or any previous visit (from the past 24 hour(s)).

## 2023-11-21 NOTE — PLAN OF CARE
Observation goals  PRIOR TO DISCHARGE        Comments: -diagnostic tests and consults completed and resulted: met  -vital signs normal or at patient baseline: partially met  -returns to baseline functional status: partially met    Nurse to notify provider when observation goals have been met and patient is ready for discharge.         PRIMARY Concern: Low BP, RLL Pneumonia   SAFETY RISK Concerns (fall risk, behaviors, etc.): None      Isolation/Type: None  Tests/Procedures for NEXT shift: None  Consults? (Pending/following, signed-off?) No  Where is patient from? (Home, TCU, etc.): Home  Other Important info for NEXT shift: Hx of neck surgery  Anticipated DC date & active delays: Possibly 11/21  _____________________________________________________________________________  SUMMARY NOTE:  Orientation/Cognitive: A&Ox4  Observation Goals (Met/ Not Met): Not met  Mobility Level/Assist Equipment: SBA  Antibiotics & Plan (IV/po, length of tx left): IV Rocephin  Pain Management: PRN Tyl x2 and flexeril x1 this shift  Tele/VS/O2: VSS on RA ex slight HTN  ABNL Lab/BG: See chart  Diet: Reg  Bowel/Bladder: cont to B/B  Skin Concerns: None  Drains/Devices: PIV SL  Patient Stated Goal for Today: Sleep

## 2023-11-21 NOTE — PLAN OF CARE
Goal Outcome Evaluation:         SUMMARY NOTE:  Orientation/Cognitive: A&Ox4  Observation Goals (Met/ Not Met): Not met  Mobility Level/Assist Equipment: SBA  Antibiotics & Plan (IV/po, length of tx left): IV Rocephin  Pain Management: PRN pain  Tele/VS/O2: VSS on RA ex slight HTN  ABNL Lab/BG: See chart  Diet: Reg  Bowel/Bladder: cont to B/B  Skin Concerns: None  Drains/Devices: PIV SL  Patient Stated Goal for Today:  Home BP medication resume .PRN medication given for cough.Started on nebulization. Pt is maintaining saturation above 90% in RA while ambulating .

## 2023-11-21 NOTE — PLAN OF CARE
Observation goals  PRIOR TO DISCHARGE        Comments: -diagnostic tests and consults completed and resulted  Met  -vital signs normal or at patient baseline  Partially met  -returns to baseline functional status   Partially met

## 2023-11-21 NOTE — PROGRESS NOTES
Observation goals  PRIOR TO DISCHARGE        Comments: -diagnostic tests and consults completed and resulted: met  -vital signs normal or at patient baseline: partially met  -returns to baseline functional status: partially met    Nurse to notify provider when observation goals have been met and patient is ready for discharge.

## 2023-11-21 NOTE — PLAN OF CARE
Problem: Fall Injury Risk  Goal: Absence of Fall and Fall-Related Injury  Outcome: Met     Problem: Skin Injury Risk Increased  Goal: Skin Health and Integrity  Outcome: Met     Problem: Cerebral Tissue Perfusion (Stroke, Ischemic/Transient Ischemic Attack)  Goal: Optimal Cerebral Tissue Perfusion  Outcome: Met     Problem: Functional Ability Impaired (Stroke, Ischemic/Transient Ischemic Attack)  Goal: Optimal Functional Ability  Outcome: Met     Problem: Sensorimotor Impairment (Stroke, Ischemic/Transient Ischemic Attack)  Goal: Improved Sensorimotor Function  Outcome: Met     Problem: Adult Inpatient Plan of Care  Goal: Plan of Care Review  Outcome: Met   Goal Outcome Evaluation:              Outcome Evaluation: Patient with stable neuro status.  Unable to complete MRI even with being medicated due to claustrophobia.  CT scan for scheduled for 2030 tonight.  No complaints of pain. VSS.   "Goal Outcome Evaluation:           PRIMARY Concern: Admitted for an episode of  low BP after taking tizanidine at home. Imaging found to have RLL pneumonia   SAFETY RISK Concerns (fall risk, behaviors, etc.): fall risk  fall band and bed alarm on  Isolation/Type: No  Tests/Procedures for NEXT shift: echo result EF 60-65%  Consults? (Pending/following, signed-off?) no  Where is patient from? (Home, TCU, etc.): home  Other Important info for NEXT shift: hx of neck surgery. Takes prn meds. Cough present   Anticipated DC date & active delays: possibly tomorrow  _____________________________________________________________________________  SUMMARY NOTE:  Orientation/Cognitive: Alert and oriented X4  Observation Goals (Met/ Not Met): Partially met  Mobility Level/Assist Equipment:  SBA  Antibiotics & Plan (IV/po, length of tx left): IV Rocephin  Pain Management: lyrica and flexeril   Tele/VS/O2: VS  /87 RA,   ABNL Lab/BG: none  Diet: regular diet  Bowel/Bladder: continence /voiding  Skin Concerns: No  Drains/Devices: NS infusing on left AC,  Saline locked at right AC  Patient Stated Goal for Today:\" rest and sleep\"             "

## 2023-11-22 VITALS
SYSTOLIC BLOOD PRESSURE: 135 MMHG | RESPIRATION RATE: 20 BRPM | TEMPERATURE: 98.3 F | WEIGHT: 212.3 LBS | HEART RATE: 85 BPM | DIASTOLIC BLOOD PRESSURE: 96 MMHG | OXYGEN SATURATION: 96 % | HEIGHT: 62 IN | BODY MASS INDEX: 39.07 KG/M2

## 2023-11-22 LAB
ANION GAP SERPL CALCULATED.3IONS-SCNC: 13 MMOL/L (ref 7–15)
BASOPHILS # BLD AUTO: 0 10E3/UL (ref 0–0.2)
BASOPHILS NFR BLD AUTO: 0 %
BUN SERPL-MCNC: 14.9 MG/DL (ref 6–20)
CALCIUM SERPL-MCNC: 10 MG/DL (ref 8.6–10)
CHLORIDE SERPL-SCNC: 104 MMOL/L (ref 98–107)
CREAT SERPL-MCNC: 0.78 MG/DL (ref 0.51–0.95)
DEPRECATED HCO3 PLAS-SCNC: 19 MMOL/L (ref 22–29)
EGFRCR SERPLBLD CKD-EPI 2021: 89 ML/MIN/1.73M2
EOSINOPHIL # BLD AUTO: 0 10E3/UL (ref 0–0.7)
EOSINOPHIL NFR BLD AUTO: 0 %
ERYTHROCYTE [DISTWIDTH] IN BLOOD BY AUTOMATED COUNT: 12.4 % (ref 10–15)
GLUCOSE SERPL-MCNC: 144 MG/DL (ref 70–99)
HCT VFR BLD AUTO: 37.4 % (ref 35–47)
HGB BLD-MCNC: 12 G/DL (ref 11.7–15.7)
IMM GRANULOCYTES # BLD: 0 10E3/UL
IMM GRANULOCYTES NFR BLD: 0 %
LYMPHOCYTES # BLD AUTO: 1.5 10E3/UL (ref 0.8–5.3)
LYMPHOCYTES NFR BLD AUTO: 16 %
MCH RBC QN AUTO: 30.6 PG (ref 26.5–33)
MCHC RBC AUTO-ENTMCNC: 32.1 G/DL (ref 31.5–36.5)
MCV RBC AUTO: 95 FL (ref 78–100)
MONOCYTES # BLD AUTO: 0.5 10E3/UL (ref 0–1.3)
MONOCYTES NFR BLD AUTO: 5 %
NEUTROPHILS # BLD AUTO: 7.7 10E3/UL (ref 1.6–8.3)
NEUTROPHILS NFR BLD AUTO: 79 %
NRBC # BLD AUTO: 0 10E3/UL
NRBC BLD AUTO-RTO: 0 /100
PLATELET # BLD AUTO: 239 10E3/UL (ref 150–450)
POTASSIUM SERPL-SCNC: 3.5 MMOL/L (ref 3.4–5.3)
RBC # BLD AUTO: 3.92 10E6/UL (ref 3.8–5.2)
SODIUM SERPL-SCNC: 136 MMOL/L (ref 135–145)
WBC # BLD AUTO: 9.8 10E3/UL (ref 4–11)

## 2023-11-22 PROCEDURE — 250N000012 HC RX MED GY IP 250 OP 636 PS 637: Performed by: PHYSICIAN ASSISTANT

## 2023-11-22 PROCEDURE — 80048 BASIC METABOLIC PNL TOTAL CA: CPT | Performed by: PHYSICIAN ASSISTANT

## 2023-11-22 PROCEDURE — G0378 HOSPITAL OBSERVATION PER HR: HCPCS

## 2023-11-22 PROCEDURE — 250N000013 HC RX MED GY IP 250 OP 250 PS 637: Performed by: INTERNAL MEDICINE

## 2023-11-22 PROCEDURE — 250N000009 HC RX 250: Performed by: PHYSICIAN ASSISTANT

## 2023-11-22 PROCEDURE — 250N000011 HC RX IP 250 OP 636: Performed by: INTERNAL MEDICINE

## 2023-11-22 PROCEDURE — 85004 AUTOMATED DIFF WBC COUNT: CPT | Performed by: PHYSICIAN ASSISTANT

## 2023-11-22 PROCEDURE — 250N000009 HC RX 250: Performed by: HOSPITALIST

## 2023-11-22 PROCEDURE — 250N000013 HC RX MED GY IP 250 OP 250 PS 637: Performed by: PHYSICIAN ASSISTANT

## 2023-11-22 PROCEDURE — 999N000157 HC STATISTIC RCP TIME EA 10 MIN

## 2023-11-22 PROCEDURE — 94640 AIRWAY INHALATION TREATMENT: CPT | Mod: 76

## 2023-11-22 PROCEDURE — 36415 COLL VENOUS BLD VENIPUNCTURE: CPT | Performed by: PHYSICIAN ASSISTANT

## 2023-11-22 PROCEDURE — 96376 TX/PRO/DX INJ SAME DRUG ADON: CPT

## 2023-11-22 PROCEDURE — 99239 HOSP IP/OBS DSCHRG MGMT >30: CPT | Performed by: HOSPITALIST

## 2023-11-22 RX ORDER — AZITHROMYCIN 250 MG/1
250 TABLET, FILM COATED ORAL DAILY
Qty: 2 TABLET | Refills: 0 | Status: SHIPPED | OUTPATIENT
Start: 2023-11-23

## 2023-11-22 RX ORDER — IPRATROPIUM BROMIDE AND ALBUTEROL SULFATE 2.5; .5 MG/3ML; MG/3ML
3 SOLUTION RESPIRATORY (INHALATION) EVERY 4 HOURS PRN
Status: DISCONTINUED | OUTPATIENT
Start: 2023-11-22 | End: 2023-11-22 | Stop reason: HOSPADM

## 2023-11-22 RX ORDER — CEFDINIR 300 MG/1
300 CAPSULE ORAL 2 TIMES DAILY
Status: DISCONTINUED | OUTPATIENT
Start: 2023-11-23 | End: 2023-11-22 | Stop reason: HOSPADM

## 2023-11-22 RX ORDER — CEFPODOXIME PROXETIL 200 MG/1
200 TABLET, FILM COATED ORAL 2 TIMES DAILY
Qty: 8 TABLET | Refills: 0 | Status: SHIPPED | OUTPATIENT
Start: 2023-11-23

## 2023-11-22 RX ORDER — BENZONATATE 100 MG/1
100 CAPSULE ORAL 3 TIMES DAILY PRN
Qty: 15 CAPSULE | Refills: 0 | Status: SHIPPED | OUTPATIENT
Start: 2023-11-22

## 2023-11-22 RX ORDER — ALBUTEROL SULFATE 90 UG/1
2 AEROSOL, METERED RESPIRATORY (INHALATION) EVERY 6 HOURS PRN
Qty: 6.7 G | Refills: 0 | Status: SHIPPED | OUTPATIENT
Start: 2023-11-22

## 2023-11-22 RX ORDER — ACETAMINOPHEN 325 MG/1
650 TABLET ORAL EVERY 6 HOURS PRN
COMMUNITY
Start: 2023-11-22

## 2023-11-22 RX ORDER — LISINOPRIL 40 MG/1
20 TABLET ORAL DAILY
DISCHARGE
Start: 2023-11-22

## 2023-11-22 RX ORDER — PREDNISONE 20 MG/1
40 TABLET ORAL DAILY
Qty: 6 TABLET | Refills: 0 | Status: SHIPPED | OUTPATIENT
Start: 2023-11-23 | End: 2023-11-26

## 2023-11-22 RX ORDER — GUAIFENESIN/DEXTROMETHORPHAN 100-10MG/5
10 SYRUP ORAL EVERY 4 HOURS PRN
COMMUNITY
Start: 2023-11-22

## 2023-11-22 RX ADMIN — METOPROLOL TARTRATE 50 MG: 50 TABLET, FILM COATED ORAL at 08:44

## 2023-11-22 RX ADMIN — GUAIFENESIN AND DEXTROMETHORPHAN 10 ML: 100; 10 SYRUP ORAL at 01:26

## 2023-11-22 RX ADMIN — PREDNISONE 40 MG: 20 TABLET ORAL at 08:45

## 2023-11-22 RX ADMIN — PREGABALIN 100 MG: 100 CAPSULE ORAL at 08:45

## 2023-11-22 RX ADMIN — AZITHROMYCIN DIHYDRATE 250 MG: 250 TABLET ORAL at 08:44

## 2023-11-22 RX ADMIN — IPRATROPIUM BROMIDE AND ALBUTEROL SULFATE 3 ML: .5; 3 SOLUTION RESPIRATORY (INHALATION) at 11:59

## 2023-11-22 RX ADMIN — IPRATROPIUM BROMIDE AND ALBUTEROL SULFATE 3 ML: .5; 3 SOLUTION RESPIRATORY (INHALATION) at 04:02

## 2023-11-22 RX ADMIN — GUAIFENESIN AND DEXTROMETHORPHAN 10 ML: 100; 10 SYRUP ORAL at 11:58

## 2023-11-22 RX ADMIN — CEFTRIAXONE SODIUM 2 G: 2 INJECTION, POWDER, FOR SOLUTION INTRAMUSCULAR; INTRAVENOUS at 08:44

## 2023-11-22 ASSESSMENT — ACTIVITIES OF DAILY LIVING (ADL)
ADLS_ACUITY_SCORE: 22

## 2023-11-22 NOTE — DISCHARGE SUMMARY
Discharge instructions given. Medication list reviewed w/ pt. Questions answered, education provided. Pt discharged home w/ daughter. Pt is ind, VSS on room air. Pt plans to follow up w/ PCP.

## 2023-11-22 NOTE — PROGRESS NOTES
PRIMARY Concern: Low BP, RLL Pneumonia   SAFETY RISK Concerns (fall risk, behaviors, etc.): None      Isolation/Type: None  Tests/Procedures for NEXT shift: None  Consults? (Pending/following, signed-off?) completed  Where is patient from? (Home, TCU, etc.): Home  Other Important info for NEXT shift: Hx of neck surgery  Anticipated DC date & active delays: Possibly 11/22  _____________________________________________________________________________  SUMMARY NOTE:  Orientation/Cognitive: A&Ox4  Observation Goals (Met/ Not Met): Not met  Mobility Level/Assist Equipment: SBA  Antibiotics & Plan (IV/po, length of tx left): IV Rocephin, Zithromax  Pain Management: denies pain in this shift  Tele/VS/O2: VSS on RA ex slight HTN  ABNL Lab/BG: n/a in this shift  Diet: Reg  Bowel/Bladder: cont to B/B  Skin Concerns: None  Drains/Devices: PIV SL  Patient Stated Goal for Today: PRN robitussin given for cough. PRN nebulization given by RT. Pt is maintaining saturation above 90% in RA          Observation goals  PRIOR TO DISCHARGE        Comments: -   diagnostic tests and consults completed and resulted- met  -vital signs normal or at patient baseline- partially met  -returns to baseline functional status- partially met

## 2023-11-22 NOTE — PROGRESS NOTES
Observation goals  PRIOR TO DISCHARGE        Comments:   -diagnostic tests and consults completed and resulted: met   -vital signs normal or at patient baseline: met   -returns to baseline functional status: met     Nurse to notify provider when observation goals have been met and patient is ready for discharge.

## 2023-11-22 NOTE — DISCHARGE SUMMARY
"[facility]  Hospitalist Discharge Summary      Date of Admission:  11/20/2023  Date of Discharge:  11/22/2023  Discharging Provider: Gianni Menendez MD  Discharge Service: Hospitalist Service    Discharge Diagnoses   Circulatory shock, resolved   Unintentional overdose of tizanidine, possibly contributing to #1  RLL community-acquired bacterial pneumonia  Asthma exacerbation  Acute kidney injury (GIACOMO), resolved  Intermittent L sided chest pain   Hypertension  Chronic pain syndrome  Cervicalgia s/p cervical laminectomy  Obesity    Clinically Significant Risk Factors     # Obesity: Estimated body mass index is 38.83 kg/m  as calculated from the following:    Height as of this encounter: 1.575 m (5' 2\").    Weight as of this encounter: 96.3 kg (212 lb 4.9 oz).       Follow-ups Needed After Discharge   Follow-up Appointments     Follow-up and recommended labs and tests       Follow up with a primary care provider in clinic within 7 days for   hospital follow-up of blood pressure, recent pneumonia, pain management,   labs, medication review and any refills.  Basic profile, CBC on day of   visit, ordered and reviewed by clinic provider.  Recommend follow-up chest   imaging in one month to confirm resolution of pneumonia, ordered and   reviewed by primary clinic provider (discuss at upcoming visit).            Unresulted Labs Ordered in the Past 30 Days of this Admission       Date and Time Order Name Status Description    11/20/2023  6:42 AM Blood Culture Peripheral Blood Preliminary     11/20/2023  6:42 AM Blood Culture Peripheral Blood Preliminary         These results will be followed up by primary clinic provider, to be reviewed at upcoming clinic visit.    Discharge Disposition   Discharged to home  Condition at discharge: Stable    Hospital Course   Zahida Mcgee is a 58 year old female visiting from [place] with hx of hypertension and chronic neck/back pain s/p cervical " laminectomy who was admitted on 11/20/2023 for transient shock of unclear etiology, possible unintentional overdose of tizanidine.  For details, see admission note.     Activated EMS for presyncope after possibly taking an extra dose of tizanidine. Initial BP in the field 90/50, subsequently decreased to 70s/40s en route to the ED. She was otherwise afebrile, other VSS. Labs significant only for creatinine 1.25, mild acute kidney injury.  Lactate normal. Troponin x2 normal. EKG showed NSR. Denied alcohol or illicit drug use. Alcohol level normal. Initially required low-dose norepinephrine in the ED, but was rapidly weaned off. CT aortic survey significant only for RLL infiltrate. Symptoms improved while in the ED, and admission for observation was requested for blood pressure monitoring and evaluation.      Circulatory shock, resolved   Unintentional overdose  *Patient did not remember how much tizanidine she took, but possible that she took more tizanidine than she reported, unintentionally. As a central alpha agent, can cause hypotension. On chronic antihypertensives at home which possibly also contributed. She had no other signs and symptoms to suggest cardiogenic or septic shock. Denied any thoughts of self harm.   *Echo with preserved EF, no significant valve abnormalities. No RWMA. Orthostatics negative.   *Pt now hypertensive 11/21, blood pressure stable  - Discontinued IVF, pt able to demonstrate adequate oral fluid intake   - Resumed prior to admission metoprolol 11/21  - lisinopril restated on discharge at adjusted dose, follow-up with primary clinic provider, monitor blood pressure    - Telemetry in hospital stable     Dizziness, resolved  Described lightheadedness with ambulation. No focal neuro deficits. Reported chest pain and headache from coughing. Denied hx of syncope or vertigo. With above event, could not definitely say if she lost consciousness. No loss of bladder/bowel function, no hx of  seizure. Fell against a window by report.  *Cardiac work-up as above including unremarkable telemetry, negative orthostatics, unremarkable echocardiogram   - Monitored in hospital  - follow-up with primary clinic provider      RLL community-acquired bacterial pneumonia  Asthma exacerbation   *Initiated on ceftriaxone and azithromycin in the ED  - On IV ceftriaxone and oral azithromycin in hospital, transitioned to oral antibiotics on discharge, see medication section  - Started prednisone 40 mg po every day X5 days on 11/21 for wheezing and acute asthma symptoms  - Duonebs q4 hrs in hospital, discontinued on discharge; PRN albuterol MDI on discharge   - Tessalon, Robitussin as needed for cough  - on room air at discharge     Acute kidney injury (GIACOMO), resolved  *Creatinine 1.25 on admission. No recent baseline, but previously 0.9-1.   - Improved to suspected baseline of 0.97 after IVF resuscitation   - Initially hld PTA lisinopril, resumed 11/22 at adjusted dose, follow-up with primary clinic provider       Intermittent L sided chest pain  Present for a couple months, chronic. Nonradiating. No correlation with activity or positional change. No associated symptoms. Mother with premature CAD.   *Troponin negative X2, EKG with NSR, slight TWI anterior leads. CT aortic survery reports no coronary artery calcification   - Consider outpatient stress testing vs CT coronary angiogram if persists, follow-up with primary clinic provider      Hypertension  *PTA regimen: lisinopril 40 mg daily, metoprolol 50 mg daily  - Resumed metoprolol 11/21, initially held lisinopril and resumed at adjusted dose on discharge     Chronic pain syndrome  Cervicalgia s/p cervical laminectomy  Baseline R sided radiculopathy.   *PTA regimen: pregabalin 100 mg TID, Flexeril 10 mg BID prn, tizanidine 4 mg BID prn  - Hold tizanidine on discharge, follow-up with primary clinic provider to review any future use moving forward  - Continue pregabalin  and Flexeril     Obesity  BMI 35.56  - long-term weight loss is recommended; lifestyle modifications; follow-up with primary clinic provider     Patient will call or seek medical attention if any worrisome signs or symptoms develop after discharge.  Patient agrees with plan as outlined and will follow up as recommended and as outlined in the dismissal summary.      Consultations This Hospital Stay   None    Code Status   Full Code    Time Spent on this Encounter   I, Gianni Menendez MD, personally saw the patient today and spent greater than 30 minutes discharging this patient.       Gianni Menendez MD  [facility] EXTENDED RECOVERY AND SHORT STAY  [address] [facility] [address]  Phone: [phone]  ______________________________________________________________________    Physical Exam   Vital Signs: Temp: 98.3  F (36.8  C) Temp src: Oral BP: (!) 135/96 Pulse: 85   Resp: 20 SpO2: 95 % O2 Device: None (Room air)    Weight: 212 lbs 4.85 oz    GENERAL awake and alert, lying in bed, no acute distress, feels ready for discharge today  LUNGS no wheezes or crackles, occasional coughing  HEART S1, S2 with RRR  ABDOMEN soft, nontender  MUSCULOSKELETAL extremities without edema  SKIN warm and dry  NEURO moves upper and lower extremities spontaneously and to command  MENTAL STATUS answering questions and following simple commands       Primary Care Physician   Provider Not In System    Discharge Orders      Reason for your hospital stay    Low blood pressure, right-sided pneumonia.  IV antibiotics in hospital.  Follow-up with a primary clinic provider recommended within one week of discharge.     Follow-up and recommended labs and tests     Follow up with a primary care provider in clinic within 7 days for hospital follow-up of blood pressure, recent pneumonia, pain management, labs, medication review and any refills.  Basic profile, CBC on day of visit, ordered and reviewed by clinic  provider.  Recommend follow-up chest imaging in one month to confirm resolution of pneumonia, ordered and reviewed by primary clinic provider (discuss at upcoming visit).     Activity    Your activity upon discharge: activity as tolerated     Diet    Follow this diet upon discharge: Orders Placed This Encounter      Regular Diet Adult       Significant Results and Procedures   Most Recent 3 CBC's:  Recent Labs   Lab Test 11/22/23  0635 11/21/23  0658 11/20/23  0513   WBC 9.8 6.1 7.3   HGB 12.0 12.2 12.0   MCV 95 94 96    219 241     Most Recent 3 BMP's:  Recent Labs   Lab Test 11/22/23  0634 11/21/23  0658 11/20/23  0513    142 138   POTASSIUM 3.5 4.2 4.2   CHLORIDE 104 109* 105   CO2 19* 24 26   BUN 14.9 13.4 13.6   CR 0.78 0.97* 1.25*   ANIONGAP 13 9 7   ALBERTO 10.0 9.8 9.9   * 100* 111*     Most Recent 2 LFT's:  Recent Labs   Lab Test 11/20/23  0513   AST 34   ALT 42   ALKPHOS 96   BILITOTAL 0.3   ,   Results for orders placed or performed during the hospital encounter of 11/20/23   XR Chest 2 Views    Narrative    EXAM: XR CHEST 2 VIEWS  LOCATION: [facility]  DATE: 11/20/2023    INDICATION: hypotension  COMPARISON: None.      Impression    IMPRESSION: Lungs are clear. No pleural effusion or pneumothorax. Normal heart size and pulmonary vascularity. Cervical spine fusion hardware.   CT Aortic Survey w Contrast    Narrative    EXAM: CT AORTIC SURVEY W CONTRAST  LOCATION: [facility]  DATE: 11/20/2023    INDICATION: Chest pain. Hypotension.  COMPARISON: None.  TECHNIQUE: CT angiogram chest abdomen pelvis during arterial phase of injection of IV contrast. 2D and 3D MIP reconstructions were performed by the CT technologist. Dose reduction techniques were used.   CONTRAST: 72mL Isovue 370.    FINDINGS:   CT ANGIOGRAM CHEST, ABDOMEN, AND PELVIS: The thoracic and abdominal aorta are normal in caliber. There is no aortic aneurysm or dissection. All major  aortic branch vessels are widely patent. There is an accessory right renal artery. The iliac arterial   system is normal in caliber.    LUNGS AND PLEURA: There is a right lower lobe infiltrate. Small band of scar or atelectasis in the lingula. There is no pneumothorax or pleural effusion.    MEDIASTINUM/AXILLAE: Normal.    CORONARY ARTERY CALCIFICATION: None.    HEPATOBILIARY: The gallbladder is contracted. There are no calcified gallstones. Diffuse fatty infiltration of the liver.    PANCREAS: Normal.    SPLEEN: Normal.    ADRENAL GLANDS: Normal.    KIDNEYS/BLADDER: Normal.    BOWEL: Normal.    LYMPH NODES: Normal.    PELVIC ORGANS: Uterus and adnexa appear normal. Small amount of free pelvic fluid.    MUSCULOSKELETAL: Mild degenerative disease in the spine.      Impression    IMPRESSION:  1.  No aortic aneurysm or dissection.  2.  Right lower lobe pneumonia.  3.  Small amount of free pelvic fluid. No abdominal or pelvic inflammation.  4.  Fatty infiltration of the liver.                Echocardiogram Complete     Value    LVEF  60-65%    Narrative    [id number]  [facility]  [id number]  [id number]^BHAKTI^[place]     [facility]  Echocardiography Laboratory  [address] [place] [address]     Name: ALAINA CHAVIRA  MRN: [id number]  : 1965  Study Date: 2023 02:55 PM  Age: 58 yrs  Gender: Female  Patient Location: [facility]  Reason For Study: Syncope  Ordering Physician: BHAKTI FLORES  Referring Physician: BHAKTI FLORES  Performed By: Lavinia Alvarez     BSA: 2.0 m2  Height: 62 in  Weight: 212 lb  HR: 60  ______________________________________________________________________________  Procedure  Complete Echo Adult. Optison (NDC #8538-0529) given intravenously.  ______________________________________________________________________________  Interpretation Summary     The left ventricle is normal in size.  Left ventricular systolic function is normal. The visual  ejection fraction is  60-65%.  Normal left ventricular wall motion  The right ventricle is normal in structure, function and size.  No hemodynamically significant valvular abnormalities on 2D or color flow  imaging. There is no comparison study available.  ______________________________________________________________________________  Left Ventricle  The left ventricle is normal in size. There is normal left ventricular wall  thickness. Left ventricular systolic function is normal. The visual ejection  fraction is 60-65%. Normal left ventricular wall motion.     Right Ventricle  The right ventricle is normal in structure, function and size.     Atria  Normal left atrial size. Right atrial size is normal. There is no atrial shunt  seen.     Mitral Valve  The mitral valve is normal in structure and function. There is trace mitral  regurgitation.     Tricuspid Valve  The tricuspid valve is normal in structure and function. There is trace  tricuspid regurgitation.     Aortic Valve  The aortic valve is normal in structure and function. No aortic regurgitation  is present. No aortic stenosis is present.     Pulmonic Valve  The pulmonic valve is not well seen, but is grossly normal. There is trace  pulmonic valvular regurgitation.     Vessels  Normal size aorta.     Pericardium  There is no pericardial effusion.     ______________________________________________________________________________  MMode/2D Measurements & Calculations  IVSd: 0.78 cm  LVIDd: 4.7 cm  LVIDs: 3.0 cm  LVPWd: 0.73 cm  FS: 37.2 %     LV mass(C)d: 113.7 grams  LV mass(C)dI: 58.0 grams/m2  Ao root diam: 3.5 cm  LA dimension: 3.7 cm  asc Aorta Diam: 3.1 cm  LA/Ao: 1.1  LVOT diam: 2.1 cm  LVOT area: 3.5 cm2  Ao root diam index Ht(cm/m): 2.3  Ao root diam index BSA (cm/m2): 1.8  Asc Ao diam index BSA (cm/m2): 1.6  Asc Ao diam index Ht(cm/m): 1.9  LA Volume (BP): 49.4 ml  LA Volume Index (BP): 25.2 ml/m2     RWT: 0.31  TAPSE: 1.5 cm     Doppler  Measurements & Calculations  MV E max felix: 107.0 cm/sec  MV A max felix: 79.7 cm/sec  MV E/A: 1.3  MV dec time: 0.23 sec  Ao V2 max: 142.0 cm/sec  Ao max P.0 mmHg  Ao V2 mean: 96.4 cm/sec  Ao mean P.0 mmHg  Ao V2 VTI: 32.8 cm  AGUSTÍN(I,D): 2.1 cm2  AGUSTÍN(V,D): 2.1 cm2  LV V1 max PG: 3.1 mmHg  LV V1 max: 87.8 cm/sec  LV V1 VTI: 20.2 cm  SV(LVOT): 69.7 ml  SI(LVOT): 35.6 ml/m2  PA acc time: 0.13 sec  TR max felix: 217.2 cm/sec  TR max P.9 mmHg  AV Felix Ratio (DI): 0.62  AGUSTÍN Index (cm2/m2): 1.1  E/E' avg: 10.3  Lateral E/e': 9.0  Medial E/e': 11.6     RV S Felix: 11.6 cm/sec     ______________________________________________________________________________  Report approved by: Cain Brennan 2023 04:50 PM             Discharge Medications   Current Discharge Medication List        START taking these medications    Details   acetaminophen (TYLENOL) 325 MG tablet Take 2 tablets (650 mg) by mouth every 6 hours as needed for mild pain or other (and adjunct with moderate or severe pain or per patient request)    Associated Diagnoses: Pain      albuterol (PROAIR HFA/PROVENTIL HFA/VENTOLIN HFA) 108 (90 Base) MCG/ACT inhaler Inhale 2 puffs into the lungs every 6 hours as needed for shortness of breath or wheezing  Qty: 6.7 g, Refills: 0    Comments: Pharmacy may dispense brand covered by insurance (Proair, or proventil or ventolin or generic albuterol inhaler)  Associated Diagnoses: Pneumonia of right lower lobe due to infectious organism      azithromycin (ZITHROMAX) 250 MG tablet Take 1 tablet (250 mg) by mouth daily  Qty: 2 tablet, Refills: 0    Associated Diagnoses: Pneumonia of right lower lobe due to infectious organism      benzonatate (TESSALON) 100 MG capsule Take 1 capsule (100 mg) by mouth 3 times daily as needed for cough  Qty: 15 capsule, Refills: 0    Associated Diagnoses: Pneumonia of right lower lobe due to infectious organism      cefpodoxime (VANTIN) 200 MG tablet Take 1 tablet (200 mg) by mouth 2  times daily  Qty: 8 tablet, Refills: 0    Associated Diagnoses: Pneumonia of right lower lobe due to infectious organism      guaiFENesin-dextromethorphan (ROBITUSSIN DM) 100-10 MG/5ML syrup Take 10 mLs by mouth every 4 hours as needed for cough    Associated Diagnoses: Pneumonia of right lower lobe due to infectious organism      predniSONE (DELTASONE) 20 MG tablet Take 2 tablets (40 mg) by mouth daily for 3 days  Qty: 6 tablet, Refills: 0    Associated Diagnoses: Pneumonia of right lower lobe due to infectious organism           CONTINUE these medications which have CHANGED    Details   lisinopril (ZESTRIL) 40 MG tablet Take 0.5 tablets (20 mg) by mouth daily Note lower dose compared to prior to admission; monitor blood pressure, review dose at upcoming clinic visit    Associated Diagnoses: Essential hypertension           CONTINUE these medications which have NOT CHANGED    Details   cyclobenzaprine (FLEXERIL) 10 MG tablet Take 10 mg by mouth 2 times daily as needed for muscle spasms      Magnesium Oxide, Laxative, (LAX) 500 MG TABS tablet Take 400 mg by mouth daily as needed      metoprolol tartrate (LOPRESSOR) 50 MG tablet Take 50 mg by mouth daily      PARoxetine (PAXIL) 20 MG tablet Take 20 mg by mouth every morning      pregabalin (LYRICA) 100 MG capsule Take 100 mg by mouth 3 times daily           STOP taking these medications       tiZANidine (ZANAFLEX) 4 MG tablet Comments:   Reason for Stopping:             Allergies   Allergies   Allergen Reactions    Sulfa Antibiotics Hallucination

## 2023-11-22 NOTE — PLAN OF CARE
"Observation goals  PRIOR TO DISCHARGE        Comments: -diagnostic tests and consults completed and resulted: met  -vital signs normal or at patient baseline: partially met  -returns to baseline functional status: partially met    Nurse to notify provider when observation goals have been met and patient is ready for discharge.         PRIMARY Concern: soft BP, imaging showed Pneumonia   SAFETY RISK Concerns (fall risk, behaviors, etc.): Fall  Isolation/Type: No  Tests/Procedures for NEXT shift: None  Consults? (Pending/following, signed-off?) None  Where is patient from? (Home, TCU, etc.): Home  Other Important info for NEXT shift: has chronic back and neck pain  Anticipated DC date & active delays: Possibly 11/22/23  _____________________________________________________________________________  SUMMARY NOTE:  Orientation/Cognitive: Alert and oriented x4  Observation Goals (Met/ Not Met): Partially met  Mobility Level/Assist Equipment: SBA  Antibiotics & Plan (IV/po, length of tx left): IV Rocephin  Pain Management: PRN flexeril given  Tele/VS/O2: VSS  /99  ABNL Lab/BG:   Diet: Regular diet  Bowel/Bladder: voiding  Skin Concerns: No  Drains/Devices: Saline locked/Rocephin iv  Patient Stated Goal for Today: \"sleep and manage pain\"\"  "

## 2023-11-22 NOTE — PLAN OF CARE
PRIMARY Concern: Low BP, RLL Pneumonia   Tests/Procedures for next shift: none   Safety Risk CONCERNS: none                   (fall risk, behaviors, etc.)  Where is patient from? (Home, TCU, etc.): home w/ daughter   Other Important info for next shift: infrequent cough   Anticipated DC date & active delays: today   SUMMARY NOTE:  Orientation/Cognitive: A/Ox4   Observation Goals (Met/ Not Met): met   Mobility Level/Assist Equipment: Ind   Pain Management: denies pain   Tele/VS/O2: VSS on room air   ABNL Lab/BG: WNL  Diet: regular   Bowel/Bladder: up to bathroom   Skin Concerns: none   Drains/Devices: IV SL   Patient Stated Goal for Today: to go home

## 2023-11-25 LAB
BACTERIA BLD CULT: NO GROWTH
BACTERIA BLD CULT: NO GROWTH